# Patient Record
Sex: FEMALE | Race: WHITE | Employment: FULL TIME | URBAN - METROPOLITAN AREA
[De-identification: names, ages, dates, MRNs, and addresses within clinical notes are randomized per-mention and may not be internally consistent; named-entity substitution may affect disease eponyms.]

---

## 2017-07-19 ENCOUNTER — NON-PROVIDER VISIT (OUTPATIENT)
Dept: URGENT CARE | Facility: CLINIC | Age: 22
End: 2017-07-19

## 2017-07-19 DIAGNOSIS — Z02.1 PRE-EMPLOYMENT DRUG SCREENING: ICD-10-CM

## 2017-07-19 LAB
AMP AMPHETAMINE: NORMAL
COC COCAINE: NORMAL
INT CON NEG: NEGATIVE
INT CON POS: POSITIVE
MET METHAMPHETAMINES: NORMAL
OPI OPIATES: NORMAL
PCP PHENCYCLIDINE: NORMAL
POC DRUG COMMENT 753798-OCCUPATIONAL HEALTH: NEGATIVE
THC: NORMAL

## 2017-07-19 PROCEDURE — 80305 DRUG TEST PRSMV DIR OPT OBS: CPT | Performed by: PHYSICIAN ASSISTANT

## 2017-09-11 ENCOUNTER — NON-PROVIDER VISIT (OUTPATIENT)
Dept: OCCUPATIONAL MEDICINE | Facility: CLINIC | Age: 22
End: 2017-09-11

## 2017-09-11 DIAGNOSIS — Z02.1 PRE-EMPLOYMENT DRUG SCREENING: ICD-10-CM

## 2017-09-11 LAB
AMP AMPHETAMINE: NORMAL
COC COCAINE: NORMAL
INT CON NEG: NORMAL
INT CON POS: NORMAL
MET METHAMPHETAMINES: NORMAL
OPI OPIATES: NORMAL
PCP PHENCYCLIDINE: NORMAL
POC DRUG COMMENT 753798-OCCUPATIONAL HEALTH: NORMAL
THC: NORMAL

## 2017-09-11 PROCEDURE — 80305 DRUG TEST PRSMV DIR OPT OBS: CPT | Performed by: PREVENTIVE MEDICINE

## 2019-09-05 ENCOUNTER — IMMUNIZATION (OUTPATIENT)
Dept: SOCIAL WORK | Facility: CLINIC | Age: 24
End: 2019-09-05

## 2019-09-05 DIAGNOSIS — Z23 NEED FOR VACCINATION: ICD-10-CM

## 2019-09-05 PROCEDURE — 90686 IIV4 VACC NO PRSV 0.5 ML IM: CPT | Performed by: REGISTERED NURSE

## 2020-08-22 ENCOUNTER — APPOINTMENT (OUTPATIENT)
Dept: RADIOLOGY | Facility: MEDICAL CENTER | Age: 25
End: 2020-08-22
Attending: EMERGENCY MEDICINE
Payer: OTHER GOVERNMENT

## 2020-08-22 ENCOUNTER — HOSPITAL ENCOUNTER (EMERGENCY)
Facility: MEDICAL CENTER | Age: 25
End: 2020-08-23
Attending: EMERGENCY MEDICINE
Payer: OTHER GOVERNMENT

## 2020-08-22 DIAGNOSIS — Z20.822 SUSPECTED COVID-19 VIRUS INFECTION: ICD-10-CM

## 2020-08-22 DIAGNOSIS — R09.02 HYPOXIA: ICD-10-CM

## 2020-08-22 DIAGNOSIS — R00.0 TACHYCARDIA: ICD-10-CM

## 2020-08-22 DIAGNOSIS — R06.02 SHORTNESS OF BREATH: ICD-10-CM

## 2020-08-22 DIAGNOSIS — D72.829 LEUKOCYTOSIS, UNSPECIFIED TYPE: ICD-10-CM

## 2020-08-22 LAB
ALBUMIN SERPL BCP-MCNC: 4.9 G/DL (ref 3.2–4.9)
ALBUMIN/GLOB SERPL: 1.4 G/DL
ALP SERPL-CCNC: 89 U/L (ref 30–99)
ALT SERPL-CCNC: 243 U/L (ref 2–50)
ANION GAP SERPL CALC-SCNC: 15 MMOL/L (ref 7–16)
AST SERPL-CCNC: 87 U/L (ref 12–45)
BASOPHILS # BLD AUTO: 0 % (ref 0–1.8)
BASOPHILS # BLD: 0 K/UL (ref 0–0.12)
BILIRUB SERPL-MCNC: 0.4 MG/DL (ref 0.1–1.5)
BUN SERPL-MCNC: 15 MG/DL (ref 8–22)
CALCIUM SERPL-MCNC: 9.6 MG/DL (ref 8.5–10.5)
CHLORIDE SERPL-SCNC: 102 MMOL/L (ref 96–112)
CO2 SERPL-SCNC: 23 MMOL/L (ref 20–33)
COVID ORDER STATUS COVID19: NORMAL
CREAT SERPL-MCNC: 0.59 MG/DL (ref 0.5–1.4)
D DIMER PPP IA.FEU-MCNC: 0.31 UG/ML (FEU) (ref 0–0.5)
EKG IMPRESSION: NORMAL
EOSINOPHIL # BLD AUTO: 0.16 K/UL (ref 0–0.51)
EOSINOPHIL NFR BLD: 0.9 % (ref 0–6.9)
ERYTHROCYTE [DISTWIDTH] IN BLOOD BY AUTOMATED COUNT: 42.3 FL (ref 35.9–50)
GLOBULIN SER CALC-MCNC: 3.4 G/DL (ref 1.9–3.5)
GLUCOSE SERPL-MCNC: 117 MG/DL (ref 65–99)
HCG SERPL QL: NEGATIVE
HCT VFR BLD AUTO: 43.7 % (ref 37–47)
HGB BLD-MCNC: 14.3 G/DL (ref 12–16)
LYMPHOCYTES # BLD AUTO: 6.2 K/UL (ref 1–4.8)
LYMPHOCYTES NFR BLD: 33.9 % (ref 22–41)
MAGNESIUM SERPL-MCNC: 2 MG/DL (ref 1.5–2.5)
MANUAL DIFF BLD: NORMAL
MCH RBC QN AUTO: 28.7 PG (ref 27–33)
MCHC RBC AUTO-ENTMCNC: 32.7 G/DL (ref 33.6–35)
MCV RBC AUTO: 87.6 FL (ref 81.4–97.8)
MONOCYTES # BLD AUTO: 0.31 K/UL (ref 0–0.85)
MONOCYTES NFR BLD AUTO: 1.7 % (ref 0–13.4)
MORPHOLOGY BLD-IMP: NORMAL
NEUTROPHILS # BLD AUTO: 11.62 K/UL (ref 2–7.15)
NEUTROPHILS NFR BLD: 63.5 % (ref 44–72)
NRBC # BLD AUTO: 0 K/UL
NRBC BLD-RTO: 0 /100 WBC
PLATELET # BLD AUTO: 452 K/UL (ref 164–446)
PLATELET BLD QL SMEAR: NORMAL
PMV BLD AUTO: 9.8 FL (ref 9–12.9)
POTASSIUM SERPL-SCNC: 4.1 MMOL/L (ref 3.6–5.5)
PROT SERPL-MCNC: 8.3 G/DL (ref 6–8.2)
RBC # BLD AUTO: 4.99 M/UL (ref 4.2–5.4)
RBC BLD AUTO: NORMAL
SODIUM SERPL-SCNC: 140 MMOL/L (ref 135–145)
TROPONIN T SERPL-MCNC: <6 NG/L (ref 6–19)
WBC # BLD AUTO: 18.3 K/UL (ref 4.8–10.8)

## 2020-08-22 PROCEDURE — 83735 ASSAY OF MAGNESIUM: CPT

## 2020-08-22 PROCEDURE — 80053 COMPREHEN METABOLIC PANEL: CPT

## 2020-08-22 PROCEDURE — 84484 ASSAY OF TROPONIN QUANT: CPT

## 2020-08-22 PROCEDURE — 85007 BL SMEAR W/DIFF WBC COUNT: CPT

## 2020-08-22 PROCEDURE — 84439 ASSAY OF FREE THYROXINE: CPT

## 2020-08-22 PROCEDURE — 84703 CHORIONIC GONADOTROPIN ASSAY: CPT

## 2020-08-22 PROCEDURE — 84443 ASSAY THYROID STIM HORMONE: CPT

## 2020-08-22 PROCEDURE — C9803 HOPD COVID-19 SPEC COLLECT: HCPCS | Performed by: EMERGENCY MEDICINE

## 2020-08-22 PROCEDURE — U0003 INFECTIOUS AGENT DETECTION BY NUCLEIC ACID (DNA OR RNA); SEVERE ACUTE RESPIRATORY SYNDROME CORONAVIRUS 2 (SARS-COV-2) (CORONAVIRUS DISEASE [COVID-19]), AMPLIFIED PROBE TECHNIQUE, MAKING USE OF HIGH THROUGHPUT TECHNOLOGIES AS DESCRIBED BY CMS-2020-01-R: HCPCS

## 2020-08-22 PROCEDURE — 99285 EMERGENCY DEPT VISIT HI MDM: CPT

## 2020-08-22 PROCEDURE — 85027 COMPLETE CBC AUTOMATED: CPT

## 2020-08-22 PROCEDURE — 85379 FIBRIN DEGRADATION QUANT: CPT

## 2020-08-22 PROCEDURE — 93005 ELECTROCARDIOGRAM TRACING: CPT | Performed by: EMERGENCY MEDICINE

## 2020-08-22 PROCEDURE — 71045 X-RAY EXAM CHEST 1 VIEW: CPT

## 2020-08-22 ASSESSMENT — LIFESTYLE VARIABLES
HAVE PEOPLE ANNOYED YOU BY CRITICIZING YOUR DRINKING: NO
EVER FELT BAD OR GUILTY ABOUT YOUR DRINKING: NO
HAVE YOU EVER FELT YOU SHOULD CUT DOWN ON YOUR DRINKING: NO
EVER HAD A DRINK FIRST THING IN THE MORNING TO STEADY YOUR NERVES TO GET RID OF A HANGOVER: NO
TOTAL SCORE: 0
DO YOU DRINK ALCOHOL: NO
DOES PATIENT WANT TO STOP DRINKING: NO
CONSUMPTION TOTAL: INCOMPLETE

## 2020-08-23 ENCOUNTER — APPOINTMENT (OUTPATIENT)
Dept: RADIOLOGY | Facility: MEDICAL CENTER | Age: 25
End: 2020-08-23
Attending: EMERGENCY MEDICINE
Payer: OTHER GOVERNMENT

## 2020-08-23 VITALS
SYSTOLIC BLOOD PRESSURE: 134 MMHG | BODY MASS INDEX: 26.5 KG/M2 | HEART RATE: 100 BPM | HEIGHT: 60 IN | TEMPERATURE: 97.5 F | WEIGHT: 135 LBS | OXYGEN SATURATION: 95 % | DIASTOLIC BLOOD PRESSURE: 89 MMHG | RESPIRATION RATE: 18 BRPM

## 2020-08-23 LAB
SARS-COV-2 RNA RESP QL NAA+PROBE: NOTDETECTED
SPECIMEN SOURCE: NORMAL
T4 FREE SERPL-MCNC: 1.28 NG/DL (ref 0.93–1.7)
TSH SERPL DL<=0.005 MIU/L-ACNC: 2.42 UIU/ML (ref 0.38–5.33)

## 2020-08-23 PROCEDURE — 71275 CT ANGIOGRAPHY CHEST: CPT

## 2020-08-23 PROCEDURE — A9270 NON-COVERED ITEM OR SERVICE: HCPCS | Performed by: EMERGENCY MEDICINE

## 2020-08-23 PROCEDURE — 700102 HCHG RX REV CODE 250 W/ 637 OVERRIDE(OP): Performed by: EMERGENCY MEDICINE

## 2020-08-23 PROCEDURE — 700117 HCHG RX CONTRAST REV CODE 255: Performed by: EMERGENCY MEDICINE

## 2020-08-23 PROCEDURE — 700111 HCHG RX REV CODE 636 W/ 250 OVERRIDE (IP): Performed by: EMERGENCY MEDICINE

## 2020-08-23 RX ORDER — PREDNISONE 20 MG/1
40 TABLET ORAL DAILY
Qty: 10 TAB | Refills: 0 | Status: SHIPPED | OUTPATIENT
Start: 2020-08-23 | End: 2020-08-28

## 2020-08-23 RX ORDER — ALBUTEROL SULFATE 90 UG/1
4 AEROSOL, METERED RESPIRATORY (INHALATION) ONCE
Status: COMPLETED | OUTPATIENT
Start: 2020-08-23 | End: 2020-08-23

## 2020-08-23 RX ORDER — ALBUTEROL SULFATE 90 UG/1
2 AEROSOL, METERED RESPIRATORY (INHALATION) EVERY 6 HOURS PRN
Qty: 8.5 G | Refills: 3 | Status: SHIPPED | OUTPATIENT
Start: 2020-08-23

## 2020-08-23 RX ORDER — PREDNISONE 20 MG/1
40 TABLET ORAL ONCE
Status: COMPLETED | OUTPATIENT
Start: 2020-08-23 | End: 2020-08-23

## 2020-08-23 RX ADMIN — ALBUTEROL SULFATE 4 PUFF: 90 AEROSOL, METERED RESPIRATORY (INHALATION) at 01:40

## 2020-08-23 RX ADMIN — IOHEXOL 50 ML: 350 INJECTION, SOLUTION INTRAVENOUS at 01:00

## 2020-08-23 RX ADMIN — PREDNISONE 40 MG: 20 TABLET ORAL at 01:40

## 2020-08-23 NOTE — ED NOTES
Patient verbalized understanding of discharge instructions, provided with discharge paperwork, gait steady, ambulated independently to PAT henderson.

## 2020-08-23 NOTE — ED TRIAGE NOTES
Chief Complaint   Patient presents with   • Shortness of Breath     For one day, improved in morning but returned, home oxygenation measured 83% on room air

## 2020-08-23 NOTE — PLAN OF CARE (IOPOC)
Patient was evaluated by me at bedside. She has no known medical history and works at [employer] however has had no direct contact with COVID 19 patients. She woke up this morning with SOB and measured her pulse ox and was noted to have 83% reading and came to ED. In ED, patient was placed on 2NC.     She states that she recently adopted 2 cats and that she has noticed some asthma like events. She also notes that when she walks up and down the stairs, she becomes more short of breath. Her brother has asthma as well. She is tachycardic on exam (sinus on monitor) but she states that she has been feeling anxious and that she is nervous about being in the hospital. Patient is non smoker but does smoke marijuana from time to time. Last known use yesterday.    Labs and imaging were reviewed by me. Her leukocytosis is likely reactive. CT chest is not consistent with COVID 19 and her Covid19 swab is negative. Suspicion is very low. I took her off the oxygen during the interview and she had saturation of 97% at rest with good wave form.    We ambulated within the room for 6 minutes while holding conversation and her saturation ranged from 94 to 97% on RA. She was not tachypneic and she was able to communicate in full sentences.    Discussed with ERP and she will be discharged home with albuterol PRN. Patient was advised to make appointment with pulmonologist to be formally evaluated for her recent symptoms. Patient is in agreement with this plan.    Indiana Shen MD

## 2020-08-23 NOTE — ED PROVIDER NOTES
ED Provider Note    Scribed for Kenn Aguilar M.D. by Boy Leung. 8/22/2020  10:22 PM    CHIEF COMPLAINT  Chief Complaint   Patient presents with   • Shortness of Breath     For one day, improved in morning but returned, home oxygenation measured 83% on room air       HPI  Aura Mccartney is a 25 y.o. female who presents for evaluation of acute shortness of breath onset when she woke up this morning. Her oxygenation at home got down to 83%. She used Mucinex today with minimal alleviation.  She had a similar episode in February with her oxygenation getting to 91%. She has not had any associated cough, congestion, abdominal pain, or diarrhea. She has not had any surgeries, is not on birth control, and denies any history of blood clots. No history of respiratory issues.     REVIEW OF SYSTEMS  Constitutional: No fevers, chills, or recent illness.  Skin: No rashes or diaphoresis.  Eyes: No change in vision, no discharge.  ENT: No hearing change. No rhinorrhea or nasal congestion, no ST or difficulty swallowing.  Respiratory: No SOB. No coughing or hemoptysis. No Wheezing.  Cardiac: No CP, palpitations, edema. No PND or orthopnea.  GI: No Abdominal Pain; N/V; diarrhea, constipation. No blood in stool.  : No dysuria. No D/C. No frequency or urgency. No hesitancy.   MSK: No pain in joints or muscles. No calf pain or swelling.  Neuro: No HA or paresthesias. No focal weakness.  Psych: No SI, HI, AH, VH.  Endocrine: No polyuria or polydipsia. No heat or cold intolerance.  Heme: No easy bruising. No history of bleeding disorders or anemia.    PAST MEDICAL HISTORY       SOCIAL HISTORY  Social History     Tobacco Use   • Smoking status: Not noted   Substance and Sexual Activity   • Alcohol use: Not noted   • Drug use: Not noted   • Sexual activity: Not noted       SURGICAL HISTORY  patient denies any surgical history    CURRENT MEDICATIONS  Home  Medications     Reviewed by Pool Shelton R.N. (Registered Nurse) on 08/22/20 at 2227  Med List Status: Partial   Medication Last Dose Status        Patient Garry Taking any Medications                       ALLERGIES  Not Known    PHYSICAL EXAM  VITAL SIGNS: /105   Pulse (!) 110   Temp 36.4 °C (97.5 °F) (Temporal)   Resp 18   Ht 1.524 m (5')   SpO2 96% Comment: 87% on RA   Pulse ox interpretation:initial pulse ox is decreased, normalized on supplemental oxygen  Genl: F sitting in chair comfortably, speaking clearly. Anxious appearing, mild distress  Head: NC/AT   ENT: Mucous membranes moist, posterior pharynx clear, uvula midline, nares patent bilaterally   Eyes: Normal sclera, pupils equal round reactive to light  Neck: Supple, FROM, no LAD appreciated   Pulmonary: Diminished bilateral lower breath sounds, no wheezes  Chest: No TTP  CV:  Tachycardic, no murmur appreciated, pulses 2+ in both upper and lower extremities, Good peripheral perfusion  Abdomen: soft, NT/ND; no rebound/guarding, no masses palpated, no HSM   : no CVA or suprapubic tenderness   Musculoskeletal: Pain free ROM of the neck. Moving upper and lower extremities and spontaneous in coordinated fashion.   Neuro: A&Ox4 (person, place, time, situation), speech fluent, gait steady, no focal deficits appreciated, No cerebellar signs Sensation is grossly intact in the distal upper and lower extremities  5/5 strength in  and dorsiflexion/plantar flexion of the ankles  Psych: Patient has an appropriate affect and behavior  Skin: No rash or lesions.  No pallor or jaundice.  No cyanosis.  Warm and dry.     DIAGNOSTIC STUDIES / PROCEDURES    EKG  Results for orders placed or performed during the hospital encounter of 08/22/20   EKG (NOW)   Result Value Ref Range    Report       [facility].    Test Date:  2020-08-22  Pt Name:    ALEJANDRA MALIK                 Department: ER  MRN:        [id number]                       Room:       [facility]  Gender:     Female                       Technician: [id number]  :        1995                   Requested By:ER TRIAGE PROTOCOL  Order #:    [id number]                    Reading MD: Jeff Hawk MD    Measurements  Intervals                                Axis  Rate:       123                          P:          70  DE:         128                          QRS:        73  QRSD:       82                           T:          33  QT:         320  QTc:        458    Interpretive Statements  SINUS TACHYCARDIA  INFERIOR Q WAVES, PROBABLY NORMAL VARIATION  No previous ECG available for comparison  Electronically Signed On 2020 22:41:38 PDT by Jeff Hawk MD       LABS  Labs Reviewed   CBC WITH DIFFERENTIAL - Abnormal; Notable for the following components:       Result Value    WBC 18.3 (*)     MCHC 32.7 (*)     Platelet Count 452 (*)     Neutrophils (Absolute) 11.62 (*)     Lymphs (Absolute) 6.20 (*)     All other components within normal limits    Narrative:     1. Age less then 50  2. Heart reate less then 100  3. 02 sat > 94%  4. No prior history of DVT or PE  5. No recent trauma or surgery (2 weeks)  6. No hemoptisis.  7. No  or HRP  8. No un-lateral leg swelling.   COMP METABOLIC PANEL - Abnormal; Notable for the following components:    Glucose 117 (*)     AST(SGOT) 87 (*)     ALT(SGPT) 243 (*)     Total Protein 8.3 (*)     All other components within normal limits    Narrative:     1. Age less then 50  2. Heart reate less then 100  3. 02 sat > 94%  4. No prior history of DVT or PE  5. No recent trauma or surgery (2 weeks)  6. No hemoptisis.  7. No  or HRP  8. No un-lateral leg swelling.   TROPONIN    Narrative:     1. Age less then 50  2. Heart reate less then 100  3. 02 sat > 94%  4. No prior history of DVT or PE  5. No recent trauma or surgery (2 weeks)  6. No hemoptisis.  7. No  or HRP  8. No un-lateral leg swelling.   MAGNESIUM    Narrative:     1. Age less then  50  2. Heart reate less then 100  3. 02 sat > 94%  4. No prior history of DVT or PE  5. No recent trauma or surgery (2 weeks)  6. No hemoptisis.  7. No  or HRP  8. No un-lateral leg swelling.   D-DIMER    Narrative:     1. Age less then 50  2. Heart reate less then 100  3. 02 sat > 94%  4. No prior history of DVT or PE  5. No recent trauma or surgery (2 weeks)  6. No hemoptisis.  7. No  or HRP  8. No un-lateral leg swelling.   FREE THYROXINE    Narrative:     1. Age less then 50  2. Heart reate less then 100  3. 02 sat > 94%  4. No prior history of DVT or PE  5. No recent trauma or surgery (2 weeks)  6. No hemoptisis.  7. No  or HRP  8. No un-lateral leg swelling.   TSH    Narrative:     1. Age less then 50  2. Heart reate less then 100  3. 02 sat > 94%  4. No prior history of DVT or PE  5. No recent trauma or surgery (2 weeks)  6. No hemoptisis.  7. No  or HRP  8. No un-lateral leg swelling.   HCG QUAL SERUM    Narrative:     1. Age less then 50  2. Heart reate less then 100  3. 02 sat > 94%  4. No prior history of DVT or PE  5. No recent trauma or surgery (2 weeks)  6. No hemoptisis.  7. No  or HRP  8. No un-lateral leg swelling.   COVID/SARS COV-2   ESTIMATED GFR    Narrative:     1. Age less then 50  2. Heart reate less then 100  3. 02 sat > 94%  4. No prior history of DVT or PE  5. No recent trauma or surgery (2 weeks)  6. No hemoptisis.  7. No  or HRP  8. No un-lateral leg swelling.   DIFFERENTIAL MANUAL    Narrative:     1. Age less then 50  2. Heart reate less then 100  3. 02 sat > 94%  4. No prior history of DVT or PE  5. No recent trauma or surgery (2 weeks)  6. No hemoptisis.  7. No  or HRP  8. No un-lateral leg swelling.   PERIPHERAL SMEAR REVIEW    Narrative:     1. Age less then 50  2. Heart reate less then 100  3. 02 sat > 94%  4. No prior history of DVT or PE  5. No recent trauma or surgery (2 weeks)  6. No hemoptisis.  7. No  or HRP  8. No un-lateral leg swelling.   PLATELET  ESTIMATE    Narrative:     1. Age less then 50  2. Heart reate less then 100  3. 02 sat > 94%  4. No prior history of DVT or PE  5. No recent trauma or surgery (2 weeks)  6. No hemoptisis.  7. No  or HRP  8. No un-lateral leg swelling.   MORPHOLOGY    Narrative:     1. Age less then 50  2. Heart reate less then 100  3. 02 sat > 94%  4. No prior history of DVT or PE  5. No recent trauma or surgery (2 weeks)  6. No hemoptisis.  7. No  or HRP  8. No un-lateral leg swelling.   SARS-COV-2, PCR (IN-HOUSE)     All labs reviewed by me.    RADIOLOGY  CT-CTA CHEST PULMONARY ARTERY W/ RECONS   Final Result         1. No CT evidence of pulmonary embolism.      2. Ill-defined groundglass opacity throughout both lung, most along the perihilar region. This could relate to atypical or viral infection.      Areas of air trapping could relate to small airway disease as well.      3. Hepatic steatosis.         DX-CHEST-PORTABLE (1 VIEW)   Final Result         1. No acute cardiopulmonary abnormalities are identified.        The radiologist's interpretations of all radiological studies have been reviewed by me.        COURSE & MEDICAL DECISION MAKING  Pertinent Labs & Imaging studies reviewed. (See chart for details)    Differential diagnoses include but not limited to: Pneumothorax  Pulmonary embolism  Pneumonia  COPD exacerbation  Asthma exacerbation  CHF exacerbation  Rib fractures  ACS  Anxiety  Foreign body  COVID    10:22 PM - Patient seen and examined at bedside. Ordered EKG, DX-chest, COVID/Sars, CBC with differential, CMP, Troponin, Magnesium, D-dimer, Free Thyroxine, TSH, and HCG Qual Serum to evaluate her symptoms.     12:58 AM - Paged Hospitalist.    1:07 AM - I discussed the patient's case and the above findings with Dr. Shen (Hospitalist) who agrees to evaluate the patient.    1:20 AM - Spoke with Dr. Shen (Hospitalist). She took patient of oxygen and ambulated her without oxygen desaturation. Hospitalization is not  necessary at this time. Patient treated with Deltasone.    1:26 AM - Patient was reevaluated at bedside. Discussed lab and radiology results with the patient. Discussed discharge instructions and return precautions with the patient and they were cleared for discharge. Patient was given the opportunity to ask any further questions. She is comfortable with discharge at this time.         PPE Note: I verified that the patient was wearing a mask and I was wearing appropriate PPE every time I entered the room. The patient's mask was on the patient at all times during my encounter except for a brief view of the oropharynx.     Scribe remained outside the patient's room and did not have any contact with the patient for the duration of patient encounter.     Medical Decision Making:   Patient presents emergency room for symptoms as described above.  On initial assessment she has hypoxia that is corrected with nasal cannula greater than 97%.  She is slightly tachypneic though she is not in acute respiratory distress.  She has some diminished air movement throughout without appreciable wheezes no history of reactive airway disease.  Overall the concern will be based on age for possible pulmonary embolus, pneumonia bacterial or atypical viral origin.  No prior history of cardiovascular dysfunction or sudden cardiac death neurology respiratory symptoms unlikely to be secondary to an acute cardiac nature.    EKG is reviewed and has inferior Q waves noted consistent for some right heart strain.  Chest x-ray shows no mediastinal widening, no definitive focal consolidation.  With her tachycardia, tachypnea she cannot be excluded by PERC criteria and suspicion is going on for new onset hypoxia that PE should be excluded.  CTA of the chest is obtained.  Labs show leukocytosis, no gross metabolic derangement.  Pregnancy test is negative.  CT a shows no acute PE but diffuse groundglass opacities in the bilateral lung fields.  This is  suspicious for COVID-19 infection and the test is currently pending.  I discussed the case for her hypoxia and respiratory symptoms with the hospitalist.  During that evaluation the patient had considerable improvement and had developed some wheezing and reported new findings such as some intermittent wheezing at home and some worsening symptomology with her pets.  This would be consistent with possible reactive airway component but had not been disclosed previously.      The hospitalist had ambulated the patient on room air with no acute episodes of hypoxia.  During my assessment the same behavior and findings are witnessed and she still has some new diffuse extra and expiratory wheezes.  She received steroids, albuterol HFA, feels substantially improved and continues to have no exertional hypoxemia.  Patient is counseled extensively regarding the findings are described on the CT above, possibility of an asthma diagnosis which should seek outpatient referral for formal testing.      HYDRATION: Based on the patient's presentation of Tachycardia and Other hemodynamic support the patient was given IV fluids. IV Hydration was used because oral hydration was not adequate alone. Upon recheck following hydration, the patient was improved.     The patient will return for new or worsening symptoms and is stable at the time of discharge.    The patient is referred to a primary physician for blood pressure management, diabetic screening, and for all other preventative health concerns.    DISPOSITION:  Patient will be discharged home in stable condition.    FOLLOW UP:  [facility], Emergency Dept  [address] [address] [phone]    If symptoms worsen    [facility]  [address]  [facility] [address] [phone]  Schedule an appointment as soon as possible for a visit       OUTPATIENT MEDICATIONS:  New Prescriptions    ALBUTEROL 108 (90 BASE) MCG/ACT AERO SOLN  INHALATION AEROSOL    Inhale 2 Puffs by mouth every 6 hours as needed for Shortness of Breath.    PREDNISONE (DELTASONE) 20 MG TAB    Take 2 Tabs by mouth every day for 5 days.     FINAL IMPRESSION  Visit Diagnoses     ICD-10-CM   1. Shortness of breath  R06.02   2. Hypoxia  R09.02   3. Suspected COVID-19 virus infection  R68.89   4. Leukocytosis, unspecified type  D72.829   5. Tachycardia  R00.0     Boy BETANCUR (Oj), am scribing for, and in the presence of, Kenn Aguilar M.D..    Electronically signed by: Boy Leung (Oj), 8/22/2020    Kenn BETANCUR M.D. personally performed the services described in this documentation, as scribed by Boy Leung in my presence, and it is both accurate and complete. C    The note accurately reflects work and decisions made by me.  Kenn Aguilar M.D.  8/23/2020  12:57 AM

## 2020-08-23 NOTE — ED NOTES
Patient awake alert and oriented x 4, [address] 15, bed in low position, call light within reach, on two liters of oxygen via nasal cannula, attached to cardiac monitor, unlabored breathing noted and has improved since previous entry, no cough noted, interacts with staff, interactions noted as appropriate.

## 2020-08-23 NOTE — ED NOTES
Patient awake alert and oriented x 4, [address] 15, bed in low position, call light within reach, on two liters of oxygen via nasal cannula, attached to cardiac monitor, labored breathing noted, no cough noted at this time, interacts with staff, interactions noted as appropriate.